# Patient Record
Sex: FEMALE | Employment: FULL TIME | URBAN - METROPOLITAN AREA
[De-identification: names, ages, dates, MRNs, and addresses within clinical notes are randomized per-mention and may not be internally consistent; named-entity substitution may affect disease eponyms.]

---

## 2019-12-05 ENCOUNTER — OFFICE VISIT (OUTPATIENT)
Dept: FAMILY MEDICINE CLINIC | Facility: CLINIC | Age: 34
End: 2019-12-05
Payer: COMMERCIAL

## 2019-12-05 VITALS
DIASTOLIC BLOOD PRESSURE: 71 MMHG | TEMPERATURE: 98 F | BODY MASS INDEX: 25.13 KG/M2 | HEIGHT: 60 IN | SYSTOLIC BLOOD PRESSURE: 107 MMHG | WEIGHT: 128 LBS | HEART RATE: 60 BPM

## 2019-12-05 DIAGNOSIS — R10.13 EPIGASTRIC PAIN: Primary | ICD-10-CM

## 2019-12-05 PROCEDURE — 99212 OFFICE O/P EST SF 10 MIN: CPT | Performed by: FAMILY MEDICINE

## 2019-12-05 PROCEDURE — 99203 OFFICE O/P NEW LOW 30 MIN: CPT | Performed by: FAMILY MEDICINE

## 2019-12-05 RX ORDER — FAMOTIDINE 40 MG/1
40 TABLET, FILM COATED ORAL 2 TIMES DAILY
Qty: 40 TABLET | Refills: 0 | Status: SHIPPED | OUTPATIENT
Start: 2019-12-05 | End: 2020-06-09 | Stop reason: ALTCHOICE

## 2019-12-05 NOTE — PROGRESS NOTES
12/5/2019  4:45 PM    Tania Aponte is a 37year old female. Chief complaint(s): Patient presents with:  New Patient  Abdominal Pain: x 1 month    HPI:     Tania Aponte primary complaint is regarding abdominal pain.      Patient 34 year heartburn, vomiting and abdominal pain. Negative for nausea, diarrhea and constipation. Musculoskeletal: Negative for back pain. Neurological: Positive for dizziness. Negative for headaches. Psychiatric/Behavioral: Negative for depressed mood. times daily.        Imaging & Referrals:  US ABDOMEN COMPLETE (CPT=76700)         Vargas Ojeda MD

## 2019-12-16 ENCOUNTER — TELEPHONE (OUTPATIENT)
Dept: FAMILY MEDICINE CLINIC | Facility: CLINIC | Age: 34
End: 2019-12-16

## 2019-12-16 NOTE — TELEPHONE ENCOUNTER
Patient is at lab requesting for order to be faxed over to [address] [facility].     GOM:[id number]

## 2019-12-16 NOTE — TELEPHONE ENCOUNTER
Barb[employer] Speaking - Patient states she was at [facility]) to complete breath test, however, [facility] is closing soon and advised patient to go to [facility] at [facility]. Patient is requesting that breath test lab be send to fax# [phone]. Patient states she is going today to complete lab. Please advise.

## 2019-12-26 ENCOUNTER — OFFICE VISIT (OUTPATIENT)
Dept: FAMILY MEDICINE CLINIC | Facility: CLINIC | Age: 34
End: 2019-12-26
Payer: COMMERCIAL

## 2019-12-26 VITALS
DIASTOLIC BLOOD PRESSURE: 67 MMHG | HEIGHT: 60 IN | WEIGHT: 127.81 LBS | HEART RATE: 59 BPM | TEMPERATURE: 98 F | SYSTOLIC BLOOD PRESSURE: 102 MMHG | BODY MASS INDEX: 25.09 KG/M2

## 2019-12-26 DIAGNOSIS — K21.9 GASTROESOPHAGEAL REFLUX DISEASE WITHOUT ESOPHAGITIS: ICD-10-CM

## 2019-12-26 DIAGNOSIS — A04.8 H. PYLORI INFECTION: Primary | ICD-10-CM

## 2019-12-26 PROCEDURE — 99213 OFFICE O/P EST LOW 20 MIN: CPT | Performed by: FAMILY MEDICINE

## 2019-12-26 PROCEDURE — 99212 OFFICE O/P EST SF 10 MIN: CPT | Performed by: FAMILY MEDICINE

## 2019-12-26 RX ORDER — OMEPRAZOLE 40 MG/1
40 CAPSULE, DELAYED RELEASE ORAL 2 TIMES DAILY
Qty: 28 CAPSULE | Refills: 0 | Status: SHIPPED | OUTPATIENT
Start: 2019-12-26 | End: 2020-01-09

## 2019-12-26 RX ORDER — CLARITHROMYCIN 500 MG/1
500 TABLET, COATED ORAL 2 TIMES DAILY
Qty: 28 TABLET | Refills: 0 | Status: SHIPPED | OUTPATIENT
Start: 2019-12-26 | End: 2020-01-09

## 2019-12-26 RX ORDER — AMOXICILLIN 500 MG/1
1000 CAPSULE ORAL 2 TIMES DAILY
Qty: 56 CAPSULE | Refills: 0 | Status: SHIPPED | OUTPATIENT
Start: 2019-12-26 | End: 2020-01-09

## 2019-12-26 NOTE — PROGRESS NOTES
12/26/2019  1:07 PM    Valarie Keys is a 37year old female. Chief complaint(s): Patient presents with:  Test Results: discuss +H Pylori results    HPI:     Valarie Keys primary complaint is regarding RUQ pain.      Patient 37year old days. 28 capsule 0   • famotidine 40 MG Oral Tab Take 1 tablet (40 mg total) by mouth 2 (two) times daily. 40 tablet 0       Allergies:  No Known Allergies      ROS:   Review of Systems   Constitutional: Negative for chills, fatigue and fever.    Respirator (BIAXIN) 500 MG Oral Tab 28 tablet 0     Sig: Take 1 tablet (500 mg total) by mouth 2 (two) times daily for 14 days.    • Omeprazole 40 MG Oral Capsule Delayed Release 28 capsule 0     Sig: Take 1 capsule (40 mg total) by mouth 2 (two) times daily for 14 da

## 2020-01-21 ENCOUNTER — TELEPHONE (OUTPATIENT)
Dept: FAMILY MEDICINE CLINIC | Facility: CLINIC | Age: 35
End: 2020-01-21

## 2020-01-21 NOTE — TELEPHONE ENCOUNTER
Melia with [facility] is requesting order [address] ([id number]) (Order #[id number]) on 12/5/19 to be faxed to their office at: [phone]. Patient will be coming into their office tomorrow.

## 2020-05-15 NOTE — PROGRESS NOTES
5/15/2020  10:14 AM    Randy Hinkle is a 37year old female.     Chief complaint(s): Patient presents with:  Dizziness: c/o dizziness before her period, headaches, breast tenderness,   Abdominal Pain: discuss recent u/s of abdomen    HPI:     [facility] • ALPRAZolam (XANAX) 0.25 MG Oral Tab Take 1 tablet (0.25 mg total) by mouth every 12 (twelve) hours as needed. 20 tablet 0   • famotidine 40 MG Oral Tab Take 1 tablet (40 mg total) by mouth 2 (two) times daily.  (Patient not taking: Reported on 5/15/2020 ) LABORATORY RESULTS:   No results found for: Ronit Frost   Results for orders placed or performed in visit on 92/39/30   HELICOBACTER PYLORI BREATH TEST, ADULT (>17)   Result Value Ref Range    RESULT: DETECTED (A) NO US BREAST LEFT COMPLETE (JXA=[id number])       RECOMMENDATIONS given include: Please, call our office with any questions or concerns. Notify Dr Tata Sargent or the [facility] if there is a deterioration or worsening of the medical condition.  Also, inform

## 2020-05-16 ENCOUNTER — LAB ENCOUNTER (OUTPATIENT)
Dept: LAB | Facility: HOSPITAL | Age: 35
End: 2020-05-16
Attending: FAMILY MEDICINE
Payer: COMMERCIAL

## 2020-05-16 DIAGNOSIS — K80.20 CALCULUS OF GALLBLADDER WITHOUT CHOLECYSTITIS WITHOUT OBSTRUCTION: ICD-10-CM

## 2020-05-16 PROCEDURE — 85025 COMPLETE CBC W/AUTO DIFF WBC: CPT

## 2020-05-16 PROCEDURE — 80053 COMPREHEN METABOLIC PANEL: CPT

## 2020-05-16 PROCEDURE — 36415 COLL VENOUS BLD VENIPUNCTURE: CPT

## 2020-05-18 ENCOUNTER — LAB ENCOUNTER (OUTPATIENT)
Dept: LAB | Facility: HOSPITAL | Age: 35
End: 2020-05-18
Attending: FAMILY MEDICINE
Payer: COMMERCIAL

## 2020-05-18 DIAGNOSIS — Z20.822 EXPOSURE TO COVID-19 VIRUS: ICD-10-CM

## 2020-05-28 ENCOUNTER — HOSPITAL ENCOUNTER (OUTPATIENT)
Dept: MAMMOGRAPHY | Facility: HOSPITAL | Age: 35
Discharge: HOME | End: 2020-05-28
Attending: FAMILY MEDICINE
Payer: COMMERCIAL

## 2020-05-28 ENCOUNTER — HOSPITAL ENCOUNTER (OUTPATIENT)
Dept: ULTRASOUND IMAGING | Facility: HOSPITAL | Age: 35
Discharge: HOME | End: 2020-05-28
Attending: FAMILY MEDICINE
Payer: COMMERCIAL

## 2020-05-28 DIAGNOSIS — N63.20 LEFT BREAST MASS: ICD-10-CM

## 2020-05-28 PROCEDURE — 77066 DX MAMMO INCL CAD BI: CPT | Performed by: FAMILY MEDICINE

## 2020-05-28 PROCEDURE — 77062 BREAST TOMOSYNTHESIS BI: CPT | Performed by: FAMILY MEDICINE

## 2020-05-28 PROCEDURE — 76641 ULTRASOUND BREAST COMPLETE: CPT | Performed by: FAMILY MEDICINE

## 2020-05-28 NOTE — IMAGING NOTE
This nurse introduced self and role of breast coordinator. Discussed recommended breast biopsy with patient.  Pt was recommended by DR Josselyn Castillo via phoneto have a  Left breast ultrasound guided biopsy for palpable mass hx  palpable 13 years since last ch Delvin Lee 0.25 MG Oral Tab, Take 1 tablet (0.25 mg total) by mouth every 12 (twelve) hours as needed. , Disp: 20 tablet, Rfl: 0  •  famotidine 40 MG Oral Tab, Take 1 tablet (40 mg total) by mouth 2 (two) times daily.  (Patient not taking: Reported on 5/15/2020 will then be taken to assure correct placement of the placed marker. Educated the patient they will be awake during this procedure and are able to drive themselves home if they wish.   Educated patient that they should eat breakfast and park in [place]

## 2020-05-29 ENCOUNTER — OFFICE VISIT (OUTPATIENT)
Dept: FAMILY MEDICINE CLINIC | Facility: CLINIC | Age: 35
End: 2020-05-29
Payer: COMMERCIAL

## 2020-05-29 ENCOUNTER — APPOINTMENT (OUTPATIENT)
Dept: LAB | Age: 35
End: 2020-05-29
Attending: FAMILY MEDICINE
Payer: COMMERCIAL

## 2020-05-29 VITALS
SYSTOLIC BLOOD PRESSURE: 104 MMHG | WEIGHT: 124 LBS | DIASTOLIC BLOOD PRESSURE: 69 MMHG | HEIGHT: 60 IN | TEMPERATURE: 98 F | BODY MASS INDEX: 24.35 KG/M2 | HEART RATE: 64 BPM

## 2020-05-29 DIAGNOSIS — A04.8 H. PYLORI INFECTION: ICD-10-CM

## 2020-05-29 DIAGNOSIS — N63.20 BREAST MASS, LEFT: Primary | ICD-10-CM

## 2020-05-29 DIAGNOSIS — D64.9 ANEMIA, UNSPECIFIED TYPE: ICD-10-CM

## 2020-05-29 PROCEDURE — 83013 H PYLORI (C-13) BREATH: CPT

## 2020-05-29 PROCEDURE — 84443 ASSAY THYROID STIM HORMONE: CPT

## 2020-05-29 PROCEDURE — 99212 OFFICE O/P EST SF 10 MIN: CPT | Performed by: FAMILY MEDICINE

## 2020-05-29 PROCEDURE — 84466 ASSAY OF TRANSFERRIN: CPT

## 2020-05-29 PROCEDURE — 99214 OFFICE O/P EST MOD 30 MIN: CPT | Performed by: FAMILY MEDICINE

## 2020-05-29 PROCEDURE — 36415 COLL VENOUS BLD VENIPUNCTURE: CPT | Performed by: FAMILY MEDICINE

## 2020-05-29 PROCEDURE — 82728 ASSAY OF FERRITIN: CPT | Performed by: FAMILY MEDICINE

## 2020-05-29 PROCEDURE — 83540 ASSAY OF IRON: CPT

## 2020-05-29 RX ORDER — FERROUS SULFATE 325(65) MG
325 TABLET ORAL 2 TIMES DAILY
Qty: 60 TABLET | Refills: 2 | Status: SHIPPED | OUTPATIENT
Start: 2020-05-29

## 2020-05-29 RX ORDER — FOLIC ACID 1 MG/1
1 TABLET ORAL DAILY
Qty: 90 TABLET | Refills: 0 | Status: SHIPPED | OUTPATIENT
Start: 2020-05-29

## 2020-05-29 NOTE — PROGRESS NOTES
5/29/2020  9:00 AM    Isaiah Oro is a 38year old female. Chief complaint(s): Patient presents with: Follow - Up: abnormal mammogram  H pylori infection  abnl CBC  HPI:     Isaiah Oro primary complaint is regarding as above.      P Immunizations: There is no immunization history on file for this patient.     Medications (Active prior to today's visit):  Current Outpatient Medications   Medication Sig Dispense Refill   • Ferrous Sulfate 325 (65 Fe) MG Oral Tab Take 1 tablet (32 performed and images were reviewed with the C3L3B Digital PRINCESS [de-identified] CAD device. 3D tomosynthesis was performed and reviewed. BREAST COMPOSITION:   CATEGORY c- Breast tissue is heterogeneously dense, which may obscure small masses.    FINDINGS:  Bilateral CC, MLO, o'clock 6 centimeters from the nipple. CONCLUSION: 1. Left breast palpable mass at 03:30 o'clock 3 centimeters from the nipple is suspicious. Ultrasound-guided biopsy is recommended.    2. Bilateral asymmetries without corresponding abnormalities a 5/28/2020 at 10:30 AM             ASSESSMENT/PLAN:   Assessment   Breast mass, left  (primary encounter diagnosis)  H. pylori infection  Anemia, unspecified type    1.  Breast mass, left       REFERRALS: US BREAST BIOPSY 1 SITE LEFT (CPT=19083)  US BREAST B Clinic if there is a deterioration or worsening of the medical condition. Also, inform the doctor with any new symptoms or medications' side effects. Iron rich diet. FOLLOW-UP: Schedule a follow-up visit in 2 weeks.          Orders This Visit:  Orders Judith

## 2020-06-01 ENCOUNTER — TELEPHONE (OUTPATIENT)
Dept: FAMILY MEDICINE CLINIC | Facility: CLINIC | Age: 35
End: 2020-06-01

## 2020-06-01 NOTE — TELEPHONE ENCOUNTER
Notes recorded by Brice Gandhi [address] on 6/1/2020 at 11:29 AM CDT  Dorina  ------    Notes recorded by Liliya Eubanks MD on 6/1/2020 at 8:32 AM CDT  Please call patient, the following results are within normal limits:  H pylori= negative.

## 2020-06-05 ENCOUNTER — HOSPITAL ENCOUNTER (OUTPATIENT)
Dept: ULTRASOUND IMAGING | Facility: HOSPITAL | Age: 35
Discharge: HOME | End: 2020-06-05
Attending: FAMILY MEDICINE
Payer: COMMERCIAL

## 2020-06-05 ENCOUNTER — HOSPITAL ENCOUNTER (OUTPATIENT)
Dept: MAMMOGRAPHY | Facility: HOSPITAL | Age: 35
Discharge: HOME | End: 2020-06-05
Attending: FAMILY MEDICINE
Payer: COMMERCIAL

## 2020-06-05 VITALS — SYSTOLIC BLOOD PRESSURE: 98 MMHG | DIASTOLIC BLOOD PRESSURE: 61 MMHG | HEART RATE: 69 BPM

## 2020-06-05 DIAGNOSIS — R92.8 ABNORMAL MAMMOGRAM: ICD-10-CM

## 2020-06-05 DIAGNOSIS — N63.20 BREAST MASS, LEFT: ICD-10-CM

## 2020-06-05 PROCEDURE — 77065 DX MAMMO INCL CAD UNI: CPT | Performed by: FAMILY MEDICINE

## 2020-06-05 PROCEDURE — 88305 TISSUE EXAM BY PATHOLOGIST: CPT | Performed by: FAMILY MEDICINE

## 2020-06-05 PROCEDURE — 19083 BX BREAST 1ST LESION US IMAG: CPT | Performed by: FAMILY MEDICINE

## 2020-06-05 NOTE — PROCEDURES
[facility]  Procedure Note    Art Reason Patient Status:  Outpatient    1985 MRN [id number]   Location [address] [facility] Attending Rebecca Chester MD   Hosp Day # 0 PCP Milagros Miller MD     Proc

## 2020-06-08 ENCOUNTER — NURSE TRIAGE (OUTPATIENT)
Dept: FAMILY MEDICINE CLINIC | Facility: CLINIC | Age: 35
End: 2020-06-08

## 2020-06-08 ENCOUNTER — TELEPHONE (OUTPATIENT)
Dept: MAMMOGRAPHY | Facility: HOSPITAL | Age: 35
End: 2020-06-08

## 2020-06-08 NOTE — TELEPHONE ENCOUNTER
Phone call made spoke with patient. CPM, Metamucil for constipation.  RTC if elected breast surgery mass removal.

## 2020-06-08 NOTE — TELEPHONE ENCOUNTER
Action Requested: Summary for Provider     []  Critical Lab, Recommendations Needed  [x] Need Additional Advice  []   FYI    []   Need Orders  [] Need Medications Sent to Pharmacy  []  Other     SUMMARY: Patient is requesting a call back from Dr. Christen Dominguez

## 2020-06-08 NOTE — IMAGING NOTE
Ms Lorraine Parikh is s/p biopsy . Phoned at 952 am post procedure call and follow up with results and recommendations left detail message to return call to [phone] to review results and recommendations.  Return call   At 959 am  and introduced myself as francisco

## 2020-06-08 NOTE — TELEPHONE ENCOUNTER
Ms Brian Maximo is s/p biopsy .   Phoned at 952 am post procedure call and follow up with results and recommendations left detail message to return call to [phone] to review results and recommendations

## 2020-06-09 ENCOUNTER — HOSPITAL ENCOUNTER (INPATIENT)
Facility: HOSPITAL | Age: 35
LOS: 3 days | Discharge: HOME | DRG: 417 | End: 2020-06-12
Attending: EMERGENCY MEDICINE | Admitting: HOSPITALIST
Payer: COMMERCIAL

## 2020-06-09 ENCOUNTER — APPOINTMENT (OUTPATIENT)
Dept: MRI IMAGING | Facility: HOSPITAL | Age: 35
DRG: 417 | End: 2020-06-09
Attending: EMERGENCY MEDICINE
Payer: COMMERCIAL

## 2020-06-09 ENCOUNTER — APPOINTMENT (OUTPATIENT)
Dept: ULTRASOUND IMAGING | Facility: HOSPITAL | Age: 35
DRG: 417 | End: 2020-06-09
Attending: EMERGENCY MEDICINE
Payer: COMMERCIAL

## 2020-06-09 ENCOUNTER — APPOINTMENT (OUTPATIENT)
Dept: CT IMAGING | Facility: HOSPITAL | Age: 35
DRG: 417 | End: 2020-06-09
Attending: EMERGENCY MEDICINE
Payer: COMMERCIAL

## 2020-06-09 DIAGNOSIS — K80.50 CHOLEDOCHOLITHIASIS: ICD-10-CM

## 2020-06-09 DIAGNOSIS — K85.10 GALLSTONE PANCREATITIS: Primary | ICD-10-CM

## 2020-06-09 PROCEDURE — 99252 IP/OBS CONSLTJ NEW/EST SF 35: CPT | Performed by: INTERNAL MEDICINE

## 2020-06-09 PROCEDURE — 99223 1ST HOSP IP/OBS HIGH 75: CPT | Performed by: HOSPITALIST

## 2020-06-09 PROCEDURE — 76376 3D RENDER W/INTRP POSTPROCES: CPT | Performed by: EMERGENCY MEDICINE

## 2020-06-09 PROCEDURE — 74181 MRI ABDOMEN W/O CONTRAST: CPT | Performed by: EMERGENCY MEDICINE

## 2020-06-09 PROCEDURE — 76705 ECHO EXAM OF ABDOMEN: CPT | Performed by: EMERGENCY MEDICINE

## 2020-06-09 PROCEDURE — 74177 CT ABD & PELVIS W/CONTRAST: CPT | Performed by: EMERGENCY MEDICINE

## 2020-06-09 RX ORDER — FAMOTIDINE 10 MG/ML
20 INJECTION, SOLUTION INTRAVENOUS ONCE
Status: COMPLETED | OUTPATIENT
Start: 2020-06-09 | End: 2020-06-09

## 2020-06-09 RX ORDER — ONDANSETRON 2 MG/ML
4 INJECTION INTRAMUSCULAR; INTRAVENOUS EVERY 6 HOURS PRN
Status: DISCONTINUED | OUTPATIENT
Start: 2020-06-09 | End: 2020-06-12

## 2020-06-09 RX ORDER — SODIUM CHLORIDE, SODIUM LACTATE, POTASSIUM CHLORIDE, CALCIUM CHLORIDE 600; 310; 30; 20 MG/100ML; MG/100ML; MG/100ML; MG/100ML
INJECTION, SOLUTION INTRAVENOUS CONTINUOUS
Status: DISCONTINUED | OUTPATIENT
Start: 2020-06-09 | End: 2020-06-12

## 2020-06-09 RX ORDER — HEPARIN SODIUM 5000 [USP'U]/ML
5000 INJECTION, SOLUTION INTRAVENOUS; SUBCUTANEOUS EVERY 12 HOURS
Status: DISCONTINUED | OUTPATIENT
Start: 2020-06-09 | End: 2020-06-12

## 2020-06-09 RX ORDER — ONDANSETRON 2 MG/ML
4 INJECTION INTRAMUSCULAR; INTRAVENOUS ONCE
Status: COMPLETED | OUTPATIENT
Start: 2020-06-09 | End: 2020-06-09

## 2020-06-09 RX ORDER — MORPHINE SULFATE 4 MG/ML
4 INJECTION, SOLUTION INTRAMUSCULAR; INTRAVENOUS EVERY 2 HOUR PRN
Status: DISCONTINUED | OUTPATIENT
Start: 2020-06-09 | End: 2020-06-12

## 2020-06-09 RX ORDER — HYDRALAZINE HYDROCHLORIDE 20 MG/ML
10 INJECTION INTRAMUSCULAR; INTRAVENOUS EVERY 4 HOURS PRN
Status: DISCONTINUED | OUTPATIENT
Start: 2020-06-09 | End: 2020-06-12

## 2020-06-09 RX ORDER — SODIUM CHLORIDE, SODIUM LACTATE, POTASSIUM CHLORIDE, CALCIUM CHLORIDE 600; 310; 30; 20 MG/100ML; MG/100ML; MG/100ML; MG/100ML
INJECTION, SOLUTION INTRAVENOUS ONCE
Status: DISCONTINUED | OUTPATIENT
Start: 2020-06-09 | End: 2020-06-12

## 2020-06-09 RX ORDER — POTASSIUM CHLORIDE 20 MEQ/1
40 TABLET, EXTENDED RELEASE ORAL ONCE
Status: COMPLETED | OUTPATIENT
Start: 2020-06-09 | End: 2020-06-09

## 2020-06-09 RX ORDER — KETOROLAC TROMETHAMINE 30 MG/ML
15 INJECTION, SOLUTION INTRAMUSCULAR; INTRAVENOUS ONCE
Status: COMPLETED | OUTPATIENT
Start: 2020-06-09 | End: 2020-06-09

## 2020-06-09 RX ORDER — FAMOTIDINE 10 MG/ML
20 INJECTION, SOLUTION INTRAVENOUS 2 TIMES DAILY
Status: DISCONTINUED | OUTPATIENT
Start: 2020-06-09 | End: 2020-06-09

## 2020-06-09 RX ORDER — MORPHINE SULFATE 2 MG/ML
2 INJECTION, SOLUTION INTRAMUSCULAR; INTRAVENOUS EVERY 2 HOUR PRN
Status: DISCONTINUED | OUTPATIENT
Start: 2020-06-09 | End: 2020-06-12

## 2020-06-09 NOTE — CONSULTS
[facility]   Gastroenterology Consultation Note    Charmaine Rudolph  Patient Status:    Inpatient  Date of Admission:         6/9/2020, Hospital day #0  Attending:   Sade Harmon MD  PCP:     Bertrand Shetty MD    Reason for Consu Diagnosis Date   • Anxiety      Past Surgical History:   Procedure Laterality Date   •      • OTHER SURGICAL HISTORY      right hand surgery   • TUBAL LIGATION       Family History   Problem Relation Age of Onset   • Other (CRF) Mother kg/m².     Gen: fatigued female patient, appears in no acute distress  HEENT: conjunctiva pink, the sclera appears anicteric  CV: RRR  Lung: moves air well; no labored breathing  Abdomen: soft, mild ttp of epigastric region without peritoneal signs apprecia teleradiology service. There are no major discrepancies.    Dictated by (CST): Alen Cason MD on 6/09/2020 at 7:55 AM     Finalized by (CST): Alen Cason MD on 6/09/2020 at [address] [facility],[address] is a 38 year ol opportunity to participate in the care of this patient.     [address] [facility] Gastroenterology  6/9/2020  [phone]

## 2020-06-09 NOTE — ED PROVIDER NOTES
Patient Seen in: [facility] AND [facility] Emergency Department      History   Patient presents with:  Abdomen/Flank Pain    Stated Complaint: abd pain    HPI    53-year-old female with history of  and tubal ligation with constipation taking iron therap time.   Skin: Skin is warm and dry. Psychiatric: Normal mood and affect. Behavior is normal.   Nursing note and vitals reviewed. Differential diagnosis includes dyspepsia, gastritis, pancreatitis, biliary colic, cholecystitis.       ED Course     Labs pancreatitis. Correlate with lipase levels. No pseudocyst or pancreatic necrosis. The gallbladder is somewhat contracted around several gallstones. No gross gallbladder inflammation. No obvious biliary dilatation. Normal appendix.   No free fluid, encounter diagnosis)    Disposition:  Admit  6/9/2020  6:14 am    Follow-up:  Pam Kate MD  [address] [place] [address]  [phone]    Schedule an appointment as soon as possible for a visit in 2 days            Medications Pres

## 2020-06-09 NOTE — ED INITIAL ASSESSMENT (HPI)
Patient states she is having mid right sided abdominal pain that radiates to back. She states she has been taking iron pills that might have been contributing to her constipation, but she has had a bowel movement and is still having the same pain.

## 2020-06-09 NOTE — PLAN OF CARE
Results of MRCP called to Dr. Jaspreet Peterson. Orders given to obtain consent for ERCP in AM, and to keep the pt NPO.      Problem: Patient/Family Goals  Goal: Patient/Family Long Term Goal  Description  Patient's Long Term Goal: to go home    Interventions:  - pain

## 2020-06-09 NOTE — H&P
[address] Patient Status:  Emergency    1985 MRN [id number]   Location [address] Attending Hellen Brenner MD   Hosp Day # 0 PCP Paris Harrison MD MG Oral Tab   No No   Sig: Take 1 tablet (325 mg total) by mouth 2 (two) times daily. famotidine 40 MG Oral Tab   No No   Sig: Take 1 tablet (40 mg total) by mouth 2 (two) times daily.    folic acid 1 MG Oral Tab   No No   Sig: Take 1 tablet (1 mg total) Results   Component Value Date    WBC 11.8 06/09/2020    HGB 10.0 06/09/2020    HCT 32.0 06/09/2020    .0 06/09/2020    CREATSERUM 0.79 06/09/2020    BUN 7 06/09/2020     06/09/2020    K 3.1 06/09/2020     06/09/2020    CO2 25.0 06/09/20

## 2020-06-10 ENCOUNTER — APPOINTMENT (OUTPATIENT)
Dept: GENERAL RADIOLOGY | Facility: HOSPITAL | Age: 35
DRG: 417 | End: 2020-06-10
Attending: INTERNAL MEDICINE
Payer: COMMERCIAL

## 2020-06-10 ENCOUNTER — ANESTHESIA (OUTPATIENT)
Dept: ENDOSCOPY | Facility: HOSPITAL | Age: 35
DRG: 417 | End: 2020-06-10
Payer: COMMERCIAL

## 2020-06-10 ENCOUNTER — ANESTHESIA EVENT (OUTPATIENT)
Dept: ENDOSCOPY | Facility: HOSPITAL | Age: 35
DRG: 417 | End: 2020-06-10
Payer: COMMERCIAL

## 2020-06-10 PROCEDURE — 43262 ENDO CHOLANGIOPANCREATOGRAPH: CPT | Performed by: INTERNAL MEDICINE

## 2020-06-10 PROCEDURE — BF101ZZ FLUOROSCOPY OF BILE DUCTS USING LOW OSMOLAR CONTRAST: ICD-10-PCS | Performed by: INTERNAL MEDICINE

## 2020-06-10 PROCEDURE — 43264 ERCP REMOVE DUCT CALCULI: CPT | Performed by: INTERNAL MEDICINE

## 2020-06-10 PROCEDURE — 74330 X-RAY BILE/PANC ENDOSCOPY: CPT | Performed by: INTERNAL MEDICINE

## 2020-06-10 PROCEDURE — 99233 SBSQ HOSP IP/OBS HIGH 50: CPT | Performed by: HOSPITALIST

## 2020-06-10 PROCEDURE — 0FC98ZZ EXTIRPATION OF MATTER FROM COMMON BILE DUCT, VIA NATURAL OR ARTIFICIAL OPENING ENDOSCOPIC: ICD-10-PCS | Performed by: INTERNAL MEDICINE

## 2020-06-10 RX ORDER — HALOPERIDOL 5 MG/ML
0.25 INJECTION INTRAMUSCULAR ONCE AS NEEDED
Status: DISCONTINUED | OUTPATIENT
Start: 2020-06-10 | End: 2020-06-10 | Stop reason: HOSPADM

## 2020-06-10 RX ORDER — HYDROMORPHONE HYDROCHLORIDE 1 MG/ML
0.6 INJECTION, SOLUTION INTRAMUSCULAR; INTRAVENOUS; SUBCUTANEOUS EVERY 5 MIN PRN
Status: DISCONTINUED | OUTPATIENT
Start: 2020-06-10 | End: 2020-06-10 | Stop reason: HOSPADM

## 2020-06-10 RX ORDER — MIDAZOLAM HYDROCHLORIDE 1 MG/ML
INJECTION INTRAMUSCULAR; INTRAVENOUS AS NEEDED
Status: DISCONTINUED | OUTPATIENT
Start: 2020-06-10 | End: 2020-06-10 | Stop reason: SURG

## 2020-06-10 RX ORDER — NEOSTIGMINE METHYLSULFATE 1 MG/ML
INJECTION INTRAVENOUS AS NEEDED
Status: DISCONTINUED | OUTPATIENT
Start: 2020-06-10 | End: 2020-06-10 | Stop reason: SURG

## 2020-06-10 RX ORDER — MORPHINE SULFATE 4 MG/ML
4 INJECTION, SOLUTION INTRAMUSCULAR; INTRAVENOUS EVERY 10 MIN PRN
Status: DISCONTINUED | OUTPATIENT
Start: 2020-06-10 | End: 2020-06-10 | Stop reason: HOSPADM

## 2020-06-10 RX ORDER — MORPHINE SULFATE 10 MG/ML
6 INJECTION, SOLUTION INTRAMUSCULAR; INTRAVENOUS EVERY 10 MIN PRN
Status: DISCONTINUED | OUTPATIENT
Start: 2020-06-10 | End: 2020-06-10 | Stop reason: HOSPADM

## 2020-06-10 RX ORDER — ROCURONIUM BROMIDE 10 MG/ML
INJECTION, SOLUTION INTRAVENOUS AS NEEDED
Status: DISCONTINUED | OUTPATIENT
Start: 2020-06-10 | End: 2020-06-10 | Stop reason: SURG

## 2020-06-10 RX ORDER — PROCHLORPERAZINE EDISYLATE 5 MG/ML
5 INJECTION INTRAMUSCULAR; INTRAVENOUS ONCE AS NEEDED
Status: DISCONTINUED | OUTPATIENT
Start: 2020-06-10 | End: 2020-06-10 | Stop reason: HOSPADM

## 2020-06-10 RX ORDER — GLYCOPYRROLATE 0.2 MG/ML
INJECTION, SOLUTION INTRAMUSCULAR; INTRAVENOUS AS NEEDED
Status: DISCONTINUED | OUTPATIENT
Start: 2020-06-10 | End: 2020-06-10 | Stop reason: SURG

## 2020-06-10 RX ORDER — HYDROCODONE BITARTRATE AND ACETAMINOPHEN 5; 325 MG/1; MG/1
2 TABLET ORAL AS NEEDED
Status: DISCONTINUED | OUTPATIENT
Start: 2020-06-10 | End: 2020-06-10 | Stop reason: HOSPADM

## 2020-06-10 RX ORDER — HYDROMORPHONE HYDROCHLORIDE 1 MG/ML
0.2 INJECTION, SOLUTION INTRAMUSCULAR; INTRAVENOUS; SUBCUTANEOUS EVERY 5 MIN PRN
Status: DISCONTINUED | OUTPATIENT
Start: 2020-06-10 | End: 2020-06-10 | Stop reason: HOSPADM

## 2020-06-10 RX ORDER — HYDROMORPHONE HYDROCHLORIDE 1 MG/ML
0.4 INJECTION, SOLUTION INTRAMUSCULAR; INTRAVENOUS; SUBCUTANEOUS EVERY 5 MIN PRN
Status: DISCONTINUED | OUTPATIENT
Start: 2020-06-10 | End: 2020-06-10 | Stop reason: HOSPADM

## 2020-06-10 RX ORDER — HYDROCODONE BITARTRATE AND ACETAMINOPHEN 5; 325 MG/1; MG/1
1 TABLET ORAL AS NEEDED
Status: DISCONTINUED | OUTPATIENT
Start: 2020-06-10 | End: 2020-06-10 | Stop reason: HOSPADM

## 2020-06-10 RX ORDER — SODIUM CHLORIDE, SODIUM LACTATE, POTASSIUM CHLORIDE, CALCIUM CHLORIDE 600; 310; 30; 20 MG/100ML; MG/100ML; MG/100ML; MG/100ML
INJECTION, SOLUTION INTRAVENOUS CONTINUOUS
Status: DISCONTINUED | OUTPATIENT
Start: 2020-06-10 | End: 2020-06-10 | Stop reason: HOSPADM

## 2020-06-10 RX ORDER — NALOXONE HYDROCHLORIDE 0.4 MG/ML
80 INJECTION, SOLUTION INTRAMUSCULAR; INTRAVENOUS; SUBCUTANEOUS AS NEEDED
Status: DISCONTINUED | OUTPATIENT
Start: 2020-06-10 | End: 2020-06-10 | Stop reason: HOSPADM

## 2020-06-10 RX ORDER — POTASSIUM CHLORIDE 14.9 MG/ML
20 INJECTION INTRAVENOUS ONCE
Status: COMPLETED | OUTPATIENT
Start: 2020-06-10 | End: 2020-06-10

## 2020-06-10 RX ORDER — ONDANSETRON 2 MG/ML
INJECTION INTRAMUSCULAR; INTRAVENOUS AS NEEDED
Status: DISCONTINUED | OUTPATIENT
Start: 2020-06-10 | End: 2020-06-10 | Stop reason: SURG

## 2020-06-10 RX ORDER — DEXAMETHASONE SODIUM PHOSPHATE 4 MG/ML
VIAL (ML) INJECTION AS NEEDED
Status: DISCONTINUED | OUTPATIENT
Start: 2020-06-10 | End: 2020-06-10 | Stop reason: SURG

## 2020-06-10 RX ORDER — ONDANSETRON 2 MG/ML
4 INJECTION INTRAMUSCULAR; INTRAVENOUS ONCE AS NEEDED
Status: DISCONTINUED | OUTPATIENT
Start: 2020-06-10 | End: 2020-06-10 | Stop reason: HOSPADM

## 2020-06-10 RX ORDER — MORPHINE SULFATE 4 MG/ML
2 INJECTION, SOLUTION INTRAMUSCULAR; INTRAVENOUS EVERY 10 MIN PRN
Status: DISCONTINUED | OUTPATIENT
Start: 2020-06-10 | End: 2020-06-10 | Stop reason: HOSPADM

## 2020-06-10 RX ADMIN — ONDANSETRON 4 MG: 2 INJECTION INTRAMUSCULAR; INTRAVENOUS at 13:42:00

## 2020-06-10 RX ADMIN — MIDAZOLAM HYDROCHLORIDE 2 MG: 1 INJECTION INTRAMUSCULAR; INTRAVENOUS at 13:09:00

## 2020-06-10 RX ADMIN — SODIUM CHLORIDE, SODIUM LACTATE, POTASSIUM CHLORIDE, CALCIUM CHLORIDE: 600; 310; 30; 20 INJECTION, SOLUTION INTRAVENOUS at 13:52:00

## 2020-06-10 RX ADMIN — GLYCOPYRROLATE 0.6 MG: 0.2 INJECTION, SOLUTION INTRAMUSCULAR; INTRAVENOUS at 13:43:00

## 2020-06-10 RX ADMIN — NEOSTIGMINE METHYLSULFATE 3 MG: 1 INJECTION INTRAVENOUS at 13:43:00

## 2020-06-10 RX ADMIN — DEXAMETHASONE SODIUM PHOSPHATE 4 MG: 4 MG/ML VIAL (ML) INJECTION at 13:27:00

## 2020-06-10 RX ADMIN — ROCURONIUM BROMIDE 20 MG: 10 INJECTION, SOLUTION INTRAVENOUS at 13:21:00

## 2020-06-10 NOTE — PROGRESS NOTES
[facility]    Progress Note    Cameron Reinoso Patient Status:  Inpatient    1985 MRN [id number]   Location [facility] 5SW/SE Attending Jarred Beach MD   Hosp Day # 1 PCP Kate Pizarro MD       Subjective:     Anderson Buerger dilatation is seen, measuring up to 0.9 cm, with periductal enhancement. If there is clinical concern for potential gallstone pancreatitis, MRCP is recommended. 4. Lesser incidental findings as above.     A preliminary report was issued by the [employer]

## 2020-06-10 NOTE — PLAN OF CARE
Problem: Patient/Family Goals  Goal: Patient/Family Long Term Goal  Description  Patient's Long Term Goal: To go home    Interventions:  - Provide comfort measures  - Provide education on gallstones   - Discuss signs and symptoms to look out for  - Monit effects  - Notify MD/LIP if interventions unsuccessful or patient reports new pain  - Anticipate increased pain with activity and pre-medicate as appropriate  Outcome: Progressing     Problem: RISK FOR INFECTION - ADULT  Goal: Absence of fever/infection du Progressing     Problem: GASTROINTESTINAL - ADULT  Goal: Maintains or returns to baseline bowel function  Description  INTERVENTIONS:  - Assess bowel function  - Maintain adequate hydration with IV or PO as ordered and tolerated  - Evaluate effectiveness o

## 2020-06-10 NOTE — H&P
History & Physical Examination    Patient Name: Stephanie Nickerson  MRN: [id number]  [facility]: [id number]  YOB: 1985    Diagnosis: choledocholithiasis    Ferrous Sulfate 325 (65 Fe) MG Oral Tab, Take 1 tablet (325 mg total) by mouth 2 (two) eddie understanding], including but not limited to the risks of bleeding, infection, pain, perforation, anesthesia related cardiopulmonary complications, and pancreatitis all requiring or leading to prolonged hospital stay, surgical intervention, and death inclu

## 2020-06-10 NOTE — ANESTHESIA PROCEDURE NOTES
Airway  Date/Time: 6/10/2020 1:13 PM  Urgency: elective    Airway not difficult    General Information and Staff    Patient location during procedure: OR  Anesthesiologist: Caitlyn Daniel MD  Performed: anesthesiologist     Indications and Patient Condi

## 2020-06-10 NOTE — PLAN OF CARE
AOX4 calls for needs appropriately   No PRNs given during shift  ERCP with stone removal today, plan for lap chol tomorrow   CLD, NPO at midnight    Problem: Patient/Family Goals  Goal: Patient/Family Long Term Goal  Description  Patient's Long Term Goal: influences on pain and pain management  - Manage/alleviate anxiety  - Utilize distraction and/or relaxation techniques  - Monitor for opioid side effects  - Notify MD/LIP if interventions unsuccessful or patient reports new pain  - Anticipate increased caryl post-hospital services based on physician/LIP order or complex needs related to functional status, cognitive ability or social support system  Outcome: Progressing     Problem: GASTROINTESTINAL - ADULT  Goal: Maintains or returns to baseline bowel function

## 2020-06-10 NOTE — PLAN OF CARE
This evening patient is comfortable with mild abdominal pain. Plan is ERCP tomorrow morning and transfer to [place] [address]. Patient is currently on LR at 200 mL/hr and NPO in preparation for the procedure tomorrow.  Bed locked in lowest position, call [facility]

## 2020-06-10 NOTE — ANESTHESIA POSTPROCEDURE EVALUATION
Patient: Patrice Solis    Procedure Summary     Date:  06/10/20 Room / Location:  [facility] ENDOSCOPY 05 / [facility] ENDOSCOPY    Anesthesia Start:  2483 Anesthesia Stop:  9273    Procedure:  ENDOSCOPIC RETROGRADE CHOLANGIOPANCREATOGRAPHY (ERCP) (N/A ) Diagnos

## 2020-06-10 NOTE — ANESTHESIA PREPROCEDURE EVALUATION
Anesthesia PreOp Note    HPI:     Quoc Soares is a 38year old female who presents for preoperative consultation requested by: Diane Wilder MD    Date of Surgery: 6/9/2020 - 6/10/2020    Procedure(s):  ENDOSCOPIC RETROGRADE [place] Pantoprazole Sodium (PROTONIX) 40 mg in Sodium Chloride 0.9 % 10 mL IV push, 40 mg, Intravenous, Daily, Yadi Issa PA-C, 40 mg at 06/10/20 0914    No current [facility]-ordered outpatient medications on file.       No Known Allergies    Family History   Problem MCV 74.6 (L) 06/10/2020    MCH 22.5 (L) 06/10/2020    MCHC 30.2 (L) 06/10/2020    RDW 18.4 (H) 06/10/2020    .0 06/10/2020    PREGU Negative 06/10/2020    URINEPREG Negative 06/09/2020     Lab Results   Component Value Date     06/10/2020 I have informed Patricemiri Clintonarch and/or legal guardian or family member of the nature of the anesthetic plan, benefits, risks including possible dental damage if relevant, major complications, and any alternative forms of anesthetic management.    All

## 2020-06-10 NOTE — OPERATIVE REPORT
Endoscopic retrograde cholangio-pancreatography (ERCP) report    Bry Shaylee ROBBINS 1985 Age 38year old   PCP Loretta Becerra MD Endoscopist Krista Cui MD     Date of procedure: 06/10/20    Procedure: ERCP w/ sphincterotomy and st jagwire. The wire was advanced into the right intrahepatic biliary system. Contrast was injected confirming bile duct cannulation. Fluid from the bile duct was also aspirated which was green/yellow in color consistent with bile duct cannulation.  A biliary

## 2020-06-11 ENCOUNTER — ANESTHESIA (OUTPATIENT)
Dept: SURGERY | Facility: HOSPITAL | Age: 35
DRG: 417 | End: 2020-06-11
Payer: COMMERCIAL

## 2020-06-11 ENCOUNTER — TELEPHONE (OUTPATIENT)
Dept: GASTROENTEROLOGY | Facility: CLINIC | Age: 35
End: 2020-06-11

## 2020-06-11 ENCOUNTER — ANESTHESIA EVENT (OUTPATIENT)
Dept: SURGERY | Facility: HOSPITAL | Age: 35
DRG: 417 | End: 2020-06-11
Payer: COMMERCIAL

## 2020-06-11 DIAGNOSIS — Z87.19 HISTORY OF CHOLELITHIASIS: Primary | ICD-10-CM

## 2020-06-11 DIAGNOSIS — R79.89 ELEVATED LFTS: ICD-10-CM

## 2020-06-11 PROCEDURE — 99232 SBSQ HOSP IP/OBS MODERATE 35: CPT | Performed by: PHYSICIAN ASSISTANT

## 2020-06-11 PROCEDURE — 47562 LAPAROSCOPIC CHOLECYSTECTOMY: CPT | Performed by: SURGERY

## 2020-06-11 PROCEDURE — 99254 IP/OBS CNSLTJ NEW/EST MOD 60: CPT | Performed by: SURGERY

## 2020-06-11 PROCEDURE — 99233 SBSQ HOSP IP/OBS HIGH 50: CPT | Performed by: HOSPITALIST

## 2020-06-11 PROCEDURE — 0FT44ZZ RESECTION OF GALLBLADDER, PERCUTANEOUS ENDOSCOPIC APPROACH: ICD-10-PCS | Performed by: SURGERY

## 2020-06-11 RX ORDER — HYDROCODONE BITARTRATE AND ACETAMINOPHEN 5; 325 MG/1; MG/1
2 TABLET ORAL AS NEEDED
Status: DISCONTINUED | OUTPATIENT
Start: 2020-06-11 | End: 2020-06-11 | Stop reason: HOSPADM

## 2020-06-11 RX ORDER — HYDROMORPHONE HYDROCHLORIDE 1 MG/ML
0.4 INJECTION, SOLUTION INTRAMUSCULAR; INTRAVENOUS; SUBCUTANEOUS EVERY 5 MIN PRN
Status: DISCONTINUED | OUTPATIENT
Start: 2020-06-11 | End: 2020-06-11 | Stop reason: HOSPADM

## 2020-06-11 RX ORDER — HYDROCODONE BITARTRATE AND ACETAMINOPHEN 5; 325 MG/1; MG/1
1 TABLET ORAL AS NEEDED
Status: DISCONTINUED | OUTPATIENT
Start: 2020-06-11 | End: 2020-06-11 | Stop reason: HOSPADM

## 2020-06-11 RX ORDER — HYDROMORPHONE HYDROCHLORIDE 1 MG/ML
0.2 INJECTION, SOLUTION INTRAMUSCULAR; INTRAVENOUS; SUBCUTANEOUS EVERY 5 MIN PRN
Status: DISCONTINUED | OUTPATIENT
Start: 2020-06-11 | End: 2020-06-11 | Stop reason: HOSPADM

## 2020-06-11 RX ORDER — ROCURONIUM BROMIDE 10 MG/ML
INJECTION, SOLUTION INTRAVENOUS AS NEEDED
Status: DISCONTINUED | OUTPATIENT
Start: 2020-06-11 | End: 2020-06-11 | Stop reason: SURG

## 2020-06-11 RX ORDER — GLYCOPYRROLATE 0.2 MG/ML
INJECTION, SOLUTION INTRAMUSCULAR; INTRAVENOUS AS NEEDED
Status: DISCONTINUED | OUTPATIENT
Start: 2020-06-11 | End: 2020-06-11 | Stop reason: SURG

## 2020-06-11 RX ORDER — MORPHINE SULFATE 4 MG/ML
4 INJECTION, SOLUTION INTRAMUSCULAR; INTRAVENOUS EVERY 10 MIN PRN
Status: DISCONTINUED | OUTPATIENT
Start: 2020-06-11 | End: 2020-06-11 | Stop reason: HOSPADM

## 2020-06-11 RX ORDER — SODIUM CHLORIDE, SODIUM LACTATE, POTASSIUM CHLORIDE, CALCIUM CHLORIDE 600; 310; 30; 20 MG/100ML; MG/100ML; MG/100ML; MG/100ML
INJECTION, SOLUTION INTRAVENOUS CONTINUOUS
Status: DISCONTINUED | OUTPATIENT
Start: 2020-06-11 | End: 2020-06-11 | Stop reason: HOSPADM

## 2020-06-11 RX ORDER — NEOSTIGMINE METHYLSULFATE 1 MG/ML
INJECTION INTRAVENOUS AS NEEDED
Status: DISCONTINUED | OUTPATIENT
Start: 2020-06-11 | End: 2020-06-11 | Stop reason: SURG

## 2020-06-11 RX ORDER — ONDANSETRON 2 MG/ML
INJECTION INTRAMUSCULAR; INTRAVENOUS AS NEEDED
Status: DISCONTINUED | OUTPATIENT
Start: 2020-06-11 | End: 2020-06-11 | Stop reason: SURG

## 2020-06-11 RX ORDER — LIDOCAINE HYDROCHLORIDE 10 MG/ML
INJECTION, SOLUTION EPIDURAL; INFILTRATION; INTRACAUDAL; PERINEURAL AS NEEDED
Status: DISCONTINUED | OUTPATIENT
Start: 2020-06-11 | End: 2020-06-11 | Stop reason: SURG

## 2020-06-11 RX ORDER — ONDANSETRON 2 MG/ML
4 INJECTION INTRAMUSCULAR; INTRAVENOUS ONCE AS NEEDED
Status: DISCONTINUED | OUTPATIENT
Start: 2020-06-11 | End: 2020-06-11 | Stop reason: HOSPADM

## 2020-06-11 RX ORDER — MORPHINE SULFATE 2 MG/ML
2 INJECTION, SOLUTION INTRAMUSCULAR; INTRAVENOUS EVERY 10 MIN PRN
Status: DISCONTINUED | OUTPATIENT
Start: 2020-06-11 | End: 2020-06-11 | Stop reason: HOSPADM

## 2020-06-11 RX ORDER — MORPHINE SULFATE 10 MG/ML
6 INJECTION, SOLUTION INTRAMUSCULAR; INTRAVENOUS EVERY 10 MIN PRN
Status: DISCONTINUED | OUTPATIENT
Start: 2020-06-11 | End: 2020-06-11 | Stop reason: HOSPADM

## 2020-06-11 RX ORDER — NALOXONE HYDROCHLORIDE 0.4 MG/ML
80 INJECTION, SOLUTION INTRAMUSCULAR; INTRAVENOUS; SUBCUTANEOUS AS NEEDED
Status: DISCONTINUED | OUTPATIENT
Start: 2020-06-11 | End: 2020-06-11 | Stop reason: HOSPADM

## 2020-06-11 RX ORDER — MIDAZOLAM HYDROCHLORIDE 1 MG/ML
INJECTION INTRAMUSCULAR; INTRAVENOUS AS NEEDED
Status: DISCONTINUED | OUTPATIENT
Start: 2020-06-11 | End: 2020-06-11 | Stop reason: SURG

## 2020-06-11 RX ORDER — DEXAMETHASONE SODIUM PHOSPHATE 4 MG/ML
VIAL (ML) INJECTION AS NEEDED
Status: DISCONTINUED | OUTPATIENT
Start: 2020-06-11 | End: 2020-06-11 | Stop reason: SURG

## 2020-06-11 RX ORDER — CEFAZOLIN SODIUM/WATER 2 G/20 ML
SYRINGE (ML) INTRAVENOUS AS NEEDED
Status: DISCONTINUED | OUTPATIENT
Start: 2020-06-11 | End: 2020-06-11 | Stop reason: SURG

## 2020-06-11 RX ORDER — HYDROMORPHONE HYDROCHLORIDE 1 MG/ML
0.6 INJECTION, SOLUTION INTRAMUSCULAR; INTRAVENOUS; SUBCUTANEOUS EVERY 5 MIN PRN
Status: DISCONTINUED | OUTPATIENT
Start: 2020-06-11 | End: 2020-06-11 | Stop reason: HOSPADM

## 2020-06-11 RX ADMIN — SODIUM CHLORIDE, SODIUM LACTATE, POTASSIUM CHLORIDE, CALCIUM CHLORIDE: 600; 310; 30; 20 INJECTION, SOLUTION INTRAVENOUS at 15:39:00

## 2020-06-11 RX ADMIN — CEFAZOLIN SODIUM/WATER 2 G: 2 G/20 ML SYRINGE (ML) INTRAVENOUS at 14:50:00

## 2020-06-11 RX ADMIN — GLYCOPYRROLATE 0.6 MG: 0.2 INJECTION, SOLUTION INTRAMUSCULAR; INTRAVENOUS at 15:27:00

## 2020-06-11 RX ADMIN — LIDOCAINE HYDROCHLORIDE 50 MG: 10 INJECTION, SOLUTION EPIDURAL; INFILTRATION; INTRACAUDAL; PERINEURAL at 14:37:00

## 2020-06-11 RX ADMIN — NEOSTIGMINE METHYLSULFATE 3 MG: 1 INJECTION INTRAVENOUS at 15:27:00

## 2020-06-11 RX ADMIN — DEXAMETHASONE SODIUM PHOSPHATE 4 MG: 4 MG/ML VIAL (ML) INJECTION at 14:49:00

## 2020-06-11 RX ADMIN — ONDANSETRON 4 MG: 2 INJECTION INTRAMUSCULAR; INTRAVENOUS at 14:50:00

## 2020-06-11 RX ADMIN — SODIUM CHLORIDE, SODIUM LACTATE, POTASSIUM CHLORIDE, CALCIUM CHLORIDE: 600; 310; 30; 20 INJECTION, SOLUTION INTRAVENOUS at 14:30:00

## 2020-06-11 RX ADMIN — MIDAZOLAM HYDROCHLORIDE 2 MG: 1 INJECTION INTRAMUSCULAR; INTRAVENOUS at 14:32:00

## 2020-06-11 RX ADMIN — ROCURONIUM BROMIDE 50 MG: 10 INJECTION, SOLUTION INTRAVENOUS at 14:37:00

## 2020-06-11 NOTE — BRIEF OP NOTE
Pre-Operative Diagnosis: Choledocholithiasis [K80.50]     Post-Operative Diagnosis: Choledocholithiasis [K80.50] ,cholecystitis     Procedure Performed:   Procedure(s):  LAPAROSCOPIC CHOLECYSTECTOMY    Surgeon(s) and Role:     Marimar eKrr MD - Primary

## 2020-06-11 NOTE — PROGRESS NOTES
[facility]    Progress Note    Taurus Moscoso Patient Status:  Inpatient    1985 MRN [id number]   Location [facility] 5SW/SE Attending Allyn Agrawal MD   Hosp Day # 2 PCP Gianfranco Lyon MD       Subjective:     Kobi Dodson on 6/09/2020 at [address] [facility] by (CST): Alden Velasquez MD on 6/09/2020 at 5:33 PM                Assessment and Plan:     Acute gallstone pancreatitis. Choledocholithiasis. Cholelithiasis. Transaminitis.   - GI on consult  - MRCP confirmed ch

## 2020-06-11 NOTE — ANESTHESIA POSTPROCEDURE EVALUATION
Patient: Felecia Limon    Procedure Summary     Date:  06/11/20 Room / Location:  [facility] OR 06 / [facility] OR    Anesthesia Start:  4624 Anesthesia Stop:      Procedure:  LAPAROSCOPIC CHOLECYSTECTOMY (N/A ) Diagnosis:       Choledocholithiasis

## 2020-06-11 NOTE — PAYOR COMM NOTE
--------------  ADMISSION REVIEW     Payor: [place] [address] #:  [id number]  Authorization Number: DENIED    Admit date: 6/9/20  Admit time: 6544       Admitting Physician: López Erwin MD  Attending Physician:  Neel Castrejon MD motion. Neck supple. Cardiovascular: Normal rate, regular rhythm and intact distal pulses. Pulmonary/Chest: Effort normal. No respiratory distress. Abdominal: Soft. Moderate pain with focality in the epigastrium only.   Musculoskeletal: Normal range gallbladder is somewhat contracted around several gallstones. No gross gallbladder inflammation. No obvious biliary dilatation. Normal appendix. No free fluid, free air, or abscess. Case discussed with  Dr. Markell Williamson in the ED at 5:42 AM ET.     [address] Florinda Perez  MRN: [id number]  CSN: [id number]  YOB: 1985    Diagnosis: choledocholithiasis    Ferrous Sulfate 325 (65 Fe) MG Oral Tab, Take 1 tablet (325 mg total) by mouth 2 (two) times daily. , Disp: 60 tablet, Rfl: 2, 6/8/2020 at [facility] surgical intervention, and death including from life threatening pancreatitis. I also mentioned the available alternatives. All questions were answered to the patient’s satisfaction.  The patient elected to proceed with ERCP with intervention [i.e. stent, s daily.     Recommend:  -NPO for now in anticipation for MRI/MRCP  -await above  -fluids at 150cc/hr  -analgesics/anti-emetics PRN  -could consider EGD + colonoscopy - EGD to r/o ulcer disease  -d/c Pepcid, start Protonix  -monitor blood counts including tr well. There were no immediate postoperative complications.  The patient’s vital signs were monitored throughout the procedure and remained stable.     Estimated blood loss: insignificant     Specimens collected:  none     Complications: none     ERCP findin kg), last menstrual period 05/12/2020, SpO2 99 %    Component Value Date     WBC 4.4 06/10/2020     HGB 8.7 (L) 06/10/2020     HCT 28.8 (L) 06/10/2020     .0 06/10/2020     CREATSERUM 0.55 06/10/2020     BUN 4 (L) 06/10/2020      06/10/2020 06/09/20 0306    0304-Given              Heparin Sodium (Porcine) 5000 UNIT/ML injection 5,000 Units   Dose: 5,000 Units  Freq: Every 12 hours Route: SC  Start: 06/09/20 0830 0951-Given   2142-Given        0915-Given   2020-Given        0830-Hold [C]

## 2020-06-11 NOTE — ANESTHESIA PREPROCEDURE EVALUATION
Anesthesia PreOp Note    HPI:     Tania Aponte is a 38year old female who presents for preoperative consultation requested by: Vince Guaman MD    Date of Surgery: 6/9/2020 - 6/11/2020    Procedure(s):  LAPAROSCOPIC CHOLECYSTECTOMY  Indication: hydrALAzine HCl (APRESOLINE) injection 10 mg, 10 mg, Intravenous, Q4H PRN, Tish Johnson MD  lactated ringers infusion, , Intravenous, Continuous, Kvng Carter MD, Last Rate: 100 mL/hr at 06/11/20 1238  [MAR Hold] Pantoprazole Sodium (PROTONIX) 40 m History Narrative      Not on file      Available pre-op labs reviewed.   Lab Results   Component Value Date    WBC 6.6 06/11/2020    RBC 4.02 06/11/2020    HGB 9.0 (L) 06/11/2020    HCT 29.9 (L) 06/11/2020    MCV 74.4 (L) 06/11/2020    MCH 22.4 (L) 06/11/2 Patient  Discussed plan with:  CRNA and surgeon      I have informed Jaden Hui and/or legal guardian or family member of the nature of the anesthetic plan, benefits, risks including possible dental damage if relevant, major complications, and a

## 2020-06-11 NOTE — PLAN OF CARE
AOx4. Denied any abdominal pain nor nausea and vomiting. NPO after midnight for planned laparoscopic cholecystectomy. LR infusing continuously as ordered. VSS.     Problem: Patient/Family Goals  Goal: Patient/Family Long Term Goal  Description  Patient's Lo social influences on pain and pain management  - Manage/alleviate anxiety  - Utilize distraction and/or relaxation techniques  - Monitor for opioid side effects  - Notify MD/LIP if interventions unsuccessful or patient reports new pain  - Anticipate increa post-hospital services based on physician/LIP order or complex needs related to functional status, cognitive ability or social support system  Outcome: Progressing     Problem: GASTROINTESTINAL - ADULT  Goal: Maintains or returns to baseline bowel function

## 2020-06-11 NOTE — OPERATIVE REPORT
[facility]    PATIENT'S NAME: Jacky Rafita   ATTENDING PHYSICIAN: Gisell García MD   OPERATING PHYSICIAN: Martha Nuñez MD   PATIENT ACCOUNT#:   [id number]    LOCATION:  [address] [place] [address] #:   [id number]       DATE OF CONRAD

## 2020-06-11 NOTE — ANESTHESIA PROCEDURE NOTES
Airway  Urgency: Elective      General Information and Staff    Patient location during procedure: OR  Anesthesiologist: Abbie Hull MD  Resident/CRNA: Urszula Encarnacion CRNA  Performed: CRNA     Indications and Patient Condition  Indications for airw

## 2020-06-11 NOTE — PROGRESS NOTES
[facility]     Gastroenterology Progress Note    Corita Stairs Patient Status:  Inpatient    1985 MRN [id number]   Location [address] [facility] Attending Kian Rebolledo, [address] [facility] Day # 2 PCP CARLYLE Middleton deficiency on outpatient labs noted - patient on PO iron at home; noted +H. Pylori 12/16/2019 and negative H. Pylori result 5/29/2020. Will order celiac disease serologies and can follow up on these outpatient.     LFTs improved today - lipase has normalize 6/10/2020  CONCLUSION:  Choledocholithiasis status post sphincterotomy and common duct stone removal.    Dictated by (CST): Amber Macias MD on 6/10/2020 at 4:26 PM     Finalized by (CST): Amber Macias MD on 6/10/2020 at 4:28 PM          Mri Abd

## 2020-06-11 NOTE — PROGRESS NOTES
Surgery note    #Consult dictated-gallstone pancreatitis with choledocholithiasis. Status post ERCP and extraction of stone. Plan laparoscopic cholecystectomy today.   Risk benefits options explained

## 2020-06-11 NOTE — CONSULTS
[facility]    PATIENT'S NAME: Jameson Sanchez   ATTENDING PHYSICIAN: Dania Brooks MD   CONSULTING PHYSICIAN: Ramya Lopez MD   PATIENT ACCOUNT#:   [id number]    LOCATION:  [facility] [address] [facility] [address] #:   [id number]       DATE OF BIR Thank you for this consultation.      Dictated By Leo Salinas MD  d: 06/11/2020 07:34:01  t: 06/11/2020 07:47:57  Job [id number]  DN/

## 2020-06-11 NOTE — TELEPHONE ENCOUNTER
"  SUBJECTIVE:                                                    Milton Gonzalez is a 47 year old male who presents to clinic today for the following health issues:    Acute Illness   Acute illness concerns: cough  Onset: 5 days    Fever: YES    Chills/Sweats: no    Headache (location?): no    Sinus Pressure:no    Conjunctivitis:  no    Ear Pain: no    Rhinorrhea: YES    Congestion: YES    Sore Throat: no     Cough: YES    Wheeze: no    Decreased Appetite: no    Nausea: no    Vomiting: no    Diarrhea:  no    Dysuria/Freq.: no    Fatigue/Achiness: YES    Sick/Strep Exposure: yes     Therapies Tried and outcome:       Patient Active Problem List   Diagnosis     Tobacco use disorder     CARDIOVASCULAR SCREENING; LDL GOAL LESS THAN 160     Chronic low back pain     Past Surgical History   Procedure Laterality Date     No history of surgery       Ent surgery       tonsils       Social History   Substance Use Topics     Smoking status: Current Every Day Smoker     Packs/day: 1.00     Years: 24.00     Types: Cigarettes     Last attempt to quit: 5/30/2008     Smokeless tobacco: Never Used     Alcohol use No     Family History   Problem Relation Age of Onset     DIABETES Father      Cancer - colorectal No family hx of      Hypertension Mother      CEREBROVASCULAR DISEASE Maternal Grandfather      CEREBROVASCULAR DISEASE Maternal Grandmother      Prostate Cancer No family hx of          Current Outpatient Prescriptions   Medication Sig Dispense Refill     Naproxen Sodium (ALEVE PO)        NO ACTIVE MEDICATIONS   0       ROS: The following systems have been completely reviewed and are negative except as noted in the HPI: CONSTITUTIONAL, HEAD AND NECK, PULMONARY    OBJECTIVE:                                                    /80 (Cuff Size: Adult Regular)  Pulse 89  Temp 98.3  F (36.8  C) (Oral)  Ht 5' 10\" (1.778 m)  Wt 150 lb (68 kg)  SpO2 97%  BMI 21.52 kg/m2 Body mass index is 21.52 kg/(m^2).  GENERAL:  alert,  no " Inpatient Orders:    Please arrange for outpatient follow up with me in the office OR telemedicine (no video visit needed) in 2-4 weeks OR Dr. Reyna Alonzo for anemia evaluation - will consider EGD/CLN at that time.  Celiac Serologies are pending (ordere distress  HENT: ear canals- normal; TMs- normal; oropharynx-mild erythema  NECK: no tenderness, no adenopathy  RESP: lungs clear to auscultation - no rales, no rhonchi, no wheezes  CV: regular rates and rhythm, normal S1 S2, no S3 or S4 and no murmur, no click or rub   MS: extremities- no edema     Results for orders placed or performed in visit on 02/13/17   Influenza A/B antigen   Result Value Ref Range    Influenza A/B Agn Specimen Nasal     Influenza A  NEG     Negative   Test results must be correlated with clinical data. If necessary, results   should be confirmed by a molecular assay or viral culture.      Influenza B  NEG     Negative   Test results must be correlated with clinical data. If necessary, results   should be confirmed by a molecular assay or viral culture.          ASSESSMENT/PLAN:                                                        ICD-10-CM    1. Viral syndrome B34.9    2. Cough R05 Influenza A/B antigen      Viral syndrome. Flu swab negative. Recommended fluids, rest, symptomatic cares reviewed. Follow-up 5-7 days if symptoms fail to improve    Edlon Jimenez MD  [facility]

## 2020-06-12 VITALS
SYSTOLIC BLOOD PRESSURE: 97 MMHG | DIASTOLIC BLOOD PRESSURE: 47 MMHG | WEIGHT: 126 LBS | HEIGHT: 62 IN | RESPIRATION RATE: 16 BRPM | BODY MASS INDEX: 23.19 KG/M2 | OXYGEN SATURATION: 96 % | TEMPERATURE: 98 F | HEART RATE: 77 BPM

## 2020-06-12 PROCEDURE — 99239 HOSP IP/OBS DSCHRG MGMT >30: CPT | Performed by: HOSPITALIST

## 2020-06-12 RX ORDER — POTASSIUM CHLORIDE 20 MEQ/1
40 TABLET, EXTENDED RELEASE ORAL ONCE
Status: COMPLETED | OUTPATIENT
Start: 2020-06-12 | End: 2020-06-12

## 2020-06-12 RX ORDER — PANTOPRAZOLE SODIUM 40 MG/1
40 TABLET, DELAYED RELEASE ORAL
Status: DISCONTINUED | OUTPATIENT
Start: 2020-06-13 | End: 2020-06-12

## 2020-06-12 RX ORDER — HYDROCODONE BITARTRATE AND ACETAMINOPHEN 5; 325 MG/1; MG/1
1 TABLET ORAL EVERY 4 HOURS PRN
Qty: 25 TABLET | Refills: 0 | Status: SHIPPED | OUTPATIENT
Start: 2020-06-12

## 2020-06-12 RX ORDER — PANTOPRAZOLE SODIUM 40 MG/1
40 TABLET, DELAYED RELEASE ORAL DAILY
Qty: 30 TABLET | Refills: 1 | Status: SHIPPED | OUTPATIENT
Start: 2020-06-12

## 2020-06-12 RX ORDER — HYDROCODONE BITARTRATE AND ACETAMINOPHEN 5; 325 MG/1; MG/1
1 TABLET ORAL EVERY 6 HOURS PRN
Status: DISCONTINUED | OUTPATIENT
Start: 2020-06-12 | End: 2020-06-12

## 2020-06-12 NOTE — DISCHARGE SUMMARY
[facility]    Discharge Summary    Valarie Keys Patient Status:  Inpatient    1985 MRN [id number]   Location [facility] 5SW/SE Attending Kian Rebolledo MD   Hosp Day # 3 PCP Carlos Fleming MD     Date of Admis claims she has had similar episodes of pain in the past that have not lasted as long and have spontaneously subsided.   She was recently seen by her primary care physician, was told she was anemic and started on iron pills which she claims made her somewhat prescription for each of these medications  · HYDROcodone-acetaminophen 5-325 MG Tabs         Follow up Visits: Follow-up Information     Jame Talavera MD. Schedule an appointment as soon as possible for a visit in 2 days.     Specialty:  Family Med

## 2020-06-12 NOTE — PROGRESS NOTES
06/12/20 1431      Method of Interpretation Translation line; [place] [employer]  services    Translated To Discharge    Name/ID [id number]       Patient dc home. IV removed.  Understands to follow up with PCP in 1 we

## 2020-06-12 NOTE — PROGRESS NOTES
[facility]    Progress Note    Ladonnajas Pham Patient Status:  Inpatient    1985 MRN [id number]   Location [facility] 5SW/SE Attending Madeline Murphy, [address] [facility] Day # 3 PCP Darien Shah MD     Assessment and Plan -- 5 --    Total Output [phone]       Net I/O     1447.5 -790 -654          Exam: Her abdomen soft nontender nondistended    Results:     Lab Results   Component Value Date    WBC 8.3 06/12/2020    HGB 8.6 (L) 06/12/2020    HCT 28.5 (L) 06/12/2020

## 2020-06-12 NOTE — TELEPHONE ENCOUNTER
Spoke with Magali Oseguera we made a f/u appt w/ Dr Venessa Aase. She prefers to come to the office.     Future Appointments   Date Time Provider Matthew Zamora   7/13/2020  2:20 PM Penny Fink MD [facility]

## 2020-06-12 NOTE — PLAN OF CARE
Problem: Patient/Family Goals  Goal: Patient/Family Long Term Goal  Description  Patient's Long Term Goal: To go home    Interventions:  - Provide comfort measures  - Provide education on gallstones   - Discuss signs and symptoms to look out for  - Monit Monitor for opioid side effects  - Notify MD/LIP if interventions unsuccessful or patient reports new pain  - Anticipate increased pain with activity and pre-medicate as appropriate  Outcome: Adequate for Discharge     Problem: RISK FOR INFECTION - ADULT cognitive ability or social support system  Outcome: Adequate for Discharge     Problem: GASTROINTESTINAL - ADULT  Goal: Maintains or returns to baseline bowel function  Description  INTERVENTIONS:  - Assess bowel function  - Maintain adequate hydration wi

## 2020-06-15 ENCOUNTER — NURSE TRIAGE (OUTPATIENT)
Dept: FAMILY MEDICINE CLINIC | Facility: CLINIC | Age: 35
End: 2020-06-15

## 2020-06-15 ENCOUNTER — PATIENT OUTREACH (OUTPATIENT)
Dept: CASE MANAGEMENT | Age: 35
End: 2020-06-15

## 2020-06-15 DIAGNOSIS — K85.10 GALLSTONE PANCREATITIS: ICD-10-CM

## 2020-06-15 DIAGNOSIS — Z02.9 ENCOUNTERS FOR ADMINISTRATIVE PURPOSE: ICD-10-CM

## 2020-06-15 PROCEDURE — 1111F DSCHRG MED/CURRENT MED MERGE: CPT

## 2020-06-15 NOTE — PROGRESS NOTES
Initial Post Discharge Follow Up   Discharge Date: 6/12/20  Contact Date: 6/15/2020    Consent Verification:  Assessment Completed With: Patient  HIPAA Verified?   Yes    Discharge Dx:   Gallstone pancreatitis         General:   • How have you been since there any medication changes noted on AVS?  yes  o If so, were these medication changes discussed with you prior to leaving the hospital? yes  • (NCM) If a new medication was prescribed:    o Was the new medication’s purpose & side effects reviewed?  yes  o around. She denies having any s/s of infection, fevers, n/v/c/d, swelling or pain in legs or any other symptoms. NCM instructed on ER if symptoms worsen. She verbalized understanding. NCM sent TE to RN triage. Med review completed.  Patient not taking iron

## 2020-06-15 NOTE — TELEPHONE ENCOUNTER
Action Requested: Summary for Provider     []  Critical Lab, Recommendations Needed  [] Need Additional Advice  [x]   FYI    []   Need Orders  [] Need Medications Sent to Pharmacy  []  Other     SUMMARY: Patient was advised to go to the ER now.         Mago

## 2020-06-15 NOTE — TELEPHONE ENCOUNTER
Unable to reach language line for assist prior to calling patient--rings x 3+ minutes x 2, then disconnects    Try again later

## 2020-06-15 NOTE — TELEPHONE ENCOUNTER
S/w patient for TCM, Gallstone pancreatitis, dc'd 6/12/2020. She states that since coming home, she has a pain in her chest when taking a deep breath (5/10). She also states that she does get out of breath when walking around.  She denies having any fevers,

## 2020-06-16 NOTE — TELEPHONE ENCOUNTER
She went to [facility] ED and did not find anything. They did x ray, scans and labs. She stated her chest pain is better. An appointment was made for Friday 6/18/20 for ED follow up.

## 2020-06-19 ENCOUNTER — OFFICE VISIT (OUTPATIENT)
Dept: FAMILY MEDICINE CLINIC | Facility: CLINIC | Age: 35
End: 2020-06-19
Payer: COMMERCIAL

## 2020-06-19 VITALS
WEIGHT: 117 LBS | DIASTOLIC BLOOD PRESSURE: 64 MMHG | HEIGHT: 62 IN | BODY MASS INDEX: 21.53 KG/M2 | HEART RATE: 69 BPM | SYSTOLIC BLOOD PRESSURE: 99 MMHG

## 2020-06-19 DIAGNOSIS — Z90.49 HISTORY OF LAPAROSCOPIC CHOLECYSTECTOMY: ICD-10-CM

## 2020-06-19 DIAGNOSIS — D50.9 IRON DEFICIENCY ANEMIA, UNSPECIFIED IRON DEFICIENCY ANEMIA TYPE: ICD-10-CM

## 2020-06-19 DIAGNOSIS — K59.03 DRUG-INDUCED CONSTIPATION: ICD-10-CM

## 2020-06-19 DIAGNOSIS — K85.10 GALLSTONE PANCREATITIS: Primary | ICD-10-CM

## 2020-06-19 PROCEDURE — 99212 OFFICE O/P EST SF 10 MIN: CPT | Performed by: NURSE PRACTITIONER

## 2020-06-19 PROCEDURE — 99214 OFFICE O/P EST MOD 30 MIN: CPT | Performed by: NURSE PRACTITIONER

## 2020-06-19 NOTE — PROGRESS NOTES
HPI  Pt seen in [address] [place] [address] ER for abd pain, found to have gallstones and pancreatitis. Underwent lap dima on 6/11/2020. Pt states is feeling better now-able to eat now without getting sick. Has been avoiding fried and spicy foods.    Has been off pain pills for Alcohol use: Never        Frequency: Never      Drug use: Never      Sexual activity: Not on file    Lifestyle      Physical activity:        Days per week: Not on file        Minutes per session: Not on file      Stress: Not on file    Relationships Er and hospital notes,imaging and labs reviewed        Assessment and Plan:  Problem List Items Addressed This Visit        Endocrine    Gallstone pancreatitis - Primary       Digestive    Drug-induced constipation     benefiber daily  May use dulcolax as

## 2020-06-19 NOTE — PATIENT INSTRUCTIONS
Instrucciones de bony para la pancreatitis aguda  Le alvarado diagnosticado pancreatitis aguda. El páncreas es un órgano que segrega hormonas y jugos gástricos. En doirs mattie, calvillo páncreas está inflamado o hinchado.  Los cálculos biliares, unas piedras duras que · Aprenda a tomarse el pulso. Lleve un registro de n [facility]. Pídale a calvillo proveedor que le indique con qué resultados debería pedir asistencia médica.     Cuidados continuos  · Dígale a calvillo proveedor todos los medicamentos que yonatan, ya que [employer] Los glóbulos rojos llevan el oxígeno a los tejidos de calvillo cuerpo. La anemia es juan afección que hace que usted tenga muy pocos glóbulos rojos. Para generar glóbulos rojos, necesita lory. La anemia hace que usted se sienta cansado y sin fuerzas.  Si la anem Programe juan visita de control con calvillo proveedor de atención médica dentro de [employer], o según lo que le hayan indicado.  [place] es para que le jose otro análisis de deneen donde se cuenten cuántos glóbulos rojos tiene para comprobar si calvillo anemia se corrigi · Siga con calvillo alimentación normal. Intente no comer alimentos grasosos, pesados ni muy condimentados por unos días. · Recuerde que se necesita al menos juan semana para recuperar la mayor parte de calvillo fuerza y [place].   · Si sufre de estreñimiento, hable co Belle laceración es un ortega en la piel. Las laceraciones pueden tratarse con puntos de sutura, grapas, [place] o pegamento para la piel.  El tratamiento dependerá de la parte del torso en la que esté el ortega, cuándo ocurrió la lesión, la causa de l · Si usaron pegamento para la piel o cinta quirúrgica, mantenga la marva limpia y [place]. Si se moja, séquela con juan toalla sin frotar. No rasque, no frote ni pellizque la cinta o la película adhesiva del pegamento.  No coloque cinta adhesiva directamente enc Programe visitas de control con calvillo proveedor de [employer] o según le hayan aconsejado. La mayoría de las lesiones en la piel sanan al cabo de sam a niurka días.  De todas formas, a veces es posible que ocurra juan infección aun con el tratamiento adecu

## 2020-06-22 NOTE — ASSESSMENT & PLAN NOTE
benefiber daily  May use dulcolax as recommended prn  Please call if symptoms worsen or are not resolving.

## 2020-06-22 NOTE — ASSESSMENT & PLAN NOTE
Wound care discussed  Advance diet as tolerated  Limit fried foods  Has f/u appt next week w GI    Please call if symptoms worsen or are not resolving.

## 2020-06-24 ENCOUNTER — VIRTUAL PHONE E/M (OUTPATIENT)
Dept: SURGERY | Facility: CLINIC | Age: 35
End: 2020-06-24
Payer: COMMERCIAL

## 2020-06-24 DIAGNOSIS — Z98.890 POST-OPERATIVE STATE: Primary | ICD-10-CM

## 2020-06-24 PROCEDURE — 99024 POSTOP FOLLOW-UP VISIT: CPT | Performed by: SURGERY

## 2020-06-24 NOTE — H&P
Virtual Telephone Check-In    Cameron Reinoso verbally consents to a Virtual/Telephone Check-In visit on 06/24/20. Patient has been referred to the [facility] website at [email or web address] to review the yearly Consent to Treat document.     Patient u

## 2020-07-13 ENCOUNTER — TELEPHONE (OUTPATIENT)
Dept: GASTROENTEROLOGY | Facility: CLINIC | Age: 35
End: 2020-07-13

## 2020-07-13 ENCOUNTER — OFFICE VISIT (OUTPATIENT)
Dept: GASTROENTEROLOGY | Facility: CLINIC | Age: 35
End: 2020-07-13
Payer: COMMERCIAL

## 2020-07-13 VITALS
WEIGHT: 119 LBS | SYSTOLIC BLOOD PRESSURE: 91 MMHG | HEIGHT: 62 IN | HEART RATE: 68 BPM | BODY MASS INDEX: 21.9 KG/M2 | DIASTOLIC BLOOD PRESSURE: 58 MMHG

## 2020-07-13 DIAGNOSIS — D50.8 OTHER IRON DEFICIENCY ANEMIA: ICD-10-CM

## 2020-07-13 DIAGNOSIS — R10.11 RUQ ABDOMINAL PAIN: ICD-10-CM

## 2020-07-13 DIAGNOSIS — R63.4 WEIGHT LOSS: Primary | ICD-10-CM

## 2020-07-13 PROCEDURE — 99214 OFFICE O/P EST MOD 30 MIN: CPT | Performed by: INTERNAL MEDICINE

## 2020-07-13 PROCEDURE — 99212 OFFICE O/P EST SF 10 MIN: CPT | Performed by: INTERNAL MEDICINE

## 2020-07-13 NOTE — PATIENT INSTRUCTIONS
1. Schedule EGD/colonoscopy with MAC [Diagnosis: ongoing RUQ pain, TANYA and weight loss]    2.  bowel prep from pharmacy (split suprep or trilyte)    3.  Continue all medications for procedure except    ** If MAC @ [facility]/NE:    - NO alcohol, recreationa

## 2020-07-13 NOTE — TELEPHONE ENCOUNTER
Scheduled for:  Colonoscopy [address] [facility]  Provider Name: Dr Kermit Goldberg   Date: Lizbeth Villa 7/28/2020  Location: [facility]    Sedation: MAC   Time: 9:30 am, pt is aware that [facility] will call with arrival time day before procedure   Prep: split dose suprep or trilyte  Meds/A

## 2020-07-13 NOTE — PROGRESS NOTES
[address] Gastroenterology  Clinic Follow-up Visit    Patient presents with:  Hospital F/U      Subjective/HPI:   Randy Hinkle is a 38year old  Female , tubal ligation, microcytic anemia (on iron therapy) and recent diagnosis of change in bowel habits, dyspepsia, dysphagia, hematemesis, hematochezia, or melena. Patient has a bowel movement each day but not evacuate completely.  Additionally there is no change of appetite, unexpected weight loss, and no reported history of chest caryl MG Oral Tab Take 1 tablet (1 mg total) by mouth daily. 90 tablet 0   • HYDROcodone-acetaminophen 5-325 MG Oral Tab Take 1 tablet by mouth every 4 (four) hours as needed.  (Patient not taking: Reported on 7/13/2020 ) 25 tablet 0   • Pantoprazole Sodium 40 MG See HPI and A&P for further details. .  ASSESSMENT/PLAN:   Maricruz Moreira is a 38year old female , tubal ligation, microcytic anemia (on iron therapy) and recent diagnosis of heartburn     1. TANYA  2. RUQ pain  3.  Weight loss 10 lbs un

## 2020-07-25 ENCOUNTER — LAB REQUISITION (OUTPATIENT)
Dept: LAB | Facility: HOSPITAL | Age: 35
End: 2020-07-25
Payer: COMMERCIAL

## 2020-07-25 DIAGNOSIS — Z20.828 CONTACT WITH AND (SUSPECTED) EXPOSURE TO OTHER VIRAL COMMUNICABLE DISEASES: ICD-10-CM

## 2020-07-27 ENCOUNTER — TELEPHONE (OUTPATIENT)
Dept: GASTROENTEROLOGY | Facility: CLINIC | Age: 35
End: 2020-07-27

## 2020-07-27 LAB — SARS-COV-2 RNA RESP QL NAA+PROBE: NOT DETECTED

## 2020-07-27 NOTE — TELEPHONE ENCOUNTER
Scheduled for:   From-Colonoscopy [address] and EGD [address]  To-Colonoscopy [id number]  Provider Name:  Dr. Esdras Florence  Date:  7/28/20  Location:    [facility]  Sedation:  MAC  Time:  0930 (pt is aware that [facility] will call the day before to confirm arrival time)   Prep:  Trilyte

## 2020-07-27 NOTE — TELEPHONE ENCOUNTER
Language Line Interpretor Sandor Olguin #[phone]    Spoke to the pt and let her know that only her colonoscopy is covered under preventative and she would have to pay out of pocket for the EGD.     She is going to call price line and find out the cost of the EGD an

## 2020-07-27 NOTE — TELEPHONE ENCOUNTER
Dr Tere Esparza    Pt states prep is not at her pharmacy. Please sign pended prep    Dr Tere ANDERSON:       Pt does not want to proceed with the EGD because she doesn't think she can afford the procedure.  She has no health care coverage for the EGD

## 2020-07-27 NOTE — TELEPHONE ENCOUNTER
Annamarie Andrea from outpt Surg center states pt has 100% coverage for the colonoscopy but 0 coverage for the EGD so she can not have the EGD. Please let out pt center know what the plan is if EGD is going to be cancelled and still do colonoscopy etc....  Annamarie Andrea [phone]

## 2020-07-28 ENCOUNTER — PATIENT MESSAGE (OUTPATIENT)
Dept: GASTROENTEROLOGY | Facility: CLINIC | Age: 35
End: 2020-07-28

## 2020-07-28 ENCOUNTER — SURGERY CENTER DOCUMENTATION (OUTPATIENT)
Dept: SURGERY | Age: 35
End: 2020-07-28

## 2020-07-28 PROCEDURE — 45378 DIAGNOSTIC COLONOSCOPY: CPT | Performed by: INTERNAL MEDICINE

## 2020-07-28 NOTE — TELEPHONE ENCOUNTER
Dr Roberta Fowler,    Per pt's insurance plan, she has no coverage for an EGD. Total cost is out of pocket for the pt. She has a limited medical plan.     I can call tomorrow to see if we can get financial assistance for the pt

## 2020-07-28 NOTE — PROCEDURES
COLONOSCOPY REPORT    Cameron Reinoso     1985 Age 38year old   PCP Kate Pizarro MD Endoscopist [place] MD     Date of procedure: 20    Procedure: Colonoscopy w/ MAC sedation    Pre-operative diagnosis: abdominal pain, weight 5. WALTER: normal rectal tone, no masses palpated. Impression:   · Given epigastric and RUQ pain and anemia should have EGD    Recommend:  · High fiber diet. · Monitor for blood in the stool.  If having more than just tinge of blood, call office or go t

## 2020-07-30 ENCOUNTER — TELEPHONE (OUTPATIENT)
Dept: GASTROENTEROLOGY | Facility: CLINIC | Age: 35
End: 2020-07-30

## 2020-07-30 NOTE — TELEPHONE ENCOUNTER
Please do not close encounter. Being forwarded FYI to Dr Av Arambula and GI RN for Magalie Osman. I left a voice message for [email or web address] in [address], ext [phone], seeing if we would be able to get coverage for egd related cost only. Will await call back .  I diana

## 2020-07-31 NOTE — TELEPHONE ENCOUNTER
Dr Hawa Parham,    I spoke to Valentina Baez in Financial Counseling. The pt was mailed an application on Wednesday for financial assistance. Eric Shaikh states that one of the counselors also reached out to the pt and spoke with her over the phone.  The pt will receiv

## (undated) DEVICE — GAMMEX® PI HYBRID SIZE 7, STERILE POWDER-FREE SURGICAL GLOVE, POLYISOPRENE AND NEOPRENE BLEND: Brand: GAMMEX

## (undated) DEVICE — Device

## (undated) DEVICE — BLADE 11 SHRP BP SS SRG STRL

## (undated) DEVICE — CONMED SCOPE SAVER BITE BLOCK, 20X27 MM: Brand: SCOPE SAVER

## (undated) DEVICE — DISPOSABLE SUCTION/IRRIGATOR TUBE SET: Brand: AHTO

## (undated) DEVICE — Device: Brand: DEFENDO AIR/WATER/SUCTION AND BIOPSY VALVE

## (undated) DEVICE — RETRIEVAL BALLOON CATHETER: Brand: EXTRACTOR™ PRO RX

## (undated) DEVICE — LINE MNTR ADLT SET O2 INTMD

## (undated) DEVICE — TROCARS: Brand: KII® BLUNT TIP ACCESS SYSTEM

## (undated) DEVICE — SUTURE VICRYL 0 UR-6

## (undated) DEVICE — TROCAR: Brand: KII SHIELDED BLADED ACCESS SYSTEM

## (undated) DEVICE — DRAPE SHEET LAPCHOLE 124X100X7

## (undated) DEVICE — Device: Brand: CUSTOM PROCEDURE KIT

## (undated) DEVICE — SUTURE MONOCRYL 3-0 Y497G

## (undated) DEVICE — SUTURE VICRYL 0 J906G

## (undated) DEVICE — SOL  .9 1000ML BTL

## (undated) DEVICE — [HIGH FLOW INSUFFLATOR,  DO NOT USE IF PACKAGE IS DAMAGED,  KEEP DRY,  KEEP AWAY FROM SUNLIGHT,  PROTECT FROM HEAT AND RADIOACTIVE SOURCES.]: Brand: PNEUMOSURE

## (undated) DEVICE — SPHINCTEROTOME: Brand: DREAMTOME™ RX 44

## (undated) DEVICE — MEDI-VAC NON-CONDUCTIVE SUCTION TUBING: Brand: CARDINAL HEALTH

## (undated) DEVICE — SUTURE PASSOR WITH GUIDE

## (undated) DEVICE — TROCAR: Brand: KII® SLEEVE

## (undated) DEVICE — LOCKING DEVICE RX & BIOPSY CAP

## (undated) DEVICE — LAP CHOLE: Brand: MEDLINE INDUSTRIES, INC.

## (undated) DEVICE — LIGACLIP 10-M/L, 10MM ENDOSCOPIC ROTATING MULTIPLE CLIP APPLIERS: Brand: LIGACLIP

## (undated) DEVICE — TRAY FOLEY BDX 16F STATLOCK

## (undated) DEVICE — SOL  .9 1000ML BAG

## (undated) DEVICE — YANKAUER SUCTION INSTRUMENT NO CONTROL VENT, BULB TIP, CLEAR: Brand: YANKAUER

## (undated) DEVICE — TISSUE RETRIEVAL SYSTEM: Brand: INZII RETRIEVAL SYSTEM

## (undated) NOTE — LETTER
[address], [place]  Authorization for Invasive Procedures  1.  I hereby authorize Dr. Balwinder Valencia , my physician and whomever may be designated as the doctor's assistant, to perform the following operation and/or procedure:  Endosc performed for the purposes of advancing medicine, science, and/or education, provided my identity is not revealed. If the procedure has been videotaped, the physician/surgeon will obtain the original videotape.  The hospital will not be responsible for stor My signature below affirms that prior to the time of the procedure, I have explained to the patient and/or her legal representative, the risks and benefits involved in the proposed treatment and any reasonable alternative to the proposed treatment.  I have

## (undated) NOTE — LETTER
[address] [place] [address] Rd, [place], [place]     AUTHORIZATION FOR SURGICAL OPERATION OR PROCEDURE    I hereby authorize Dr. Richardson Goodpasture , my Physician(s) and whomever may be designated as the doctor's Assistant, to perform the following opera purposes of advancing medicine, science and/or education, provided my identity is not revealed. If the procedure has been videotaped, the physician/surgeon will obtain the original videotape.  The hospital will not be responsible for storage or maintenance (Date)                                (Time)  STATEMENT OF PHYSICIAN My signature below affirms that prior to the time of the procedure; I have explained to the patient and/or his/her legal representative, the risk

## (undated) NOTE — LETTER
01/06/20        [place] [address]      Dear Becky Andrew,    [facility] records indicate that you have outstanding lab work and or testing that was ordered for you and has not yet been completed:  Orders Placed This Encount

## (undated) NOTE — LETTER
[facility]  Administration of Anesthesia  1. I, Tessa Rajan, or _________________________________ acting on her behalf, (Patient) (Dependent/Representative) request to receive anesthesia for my pending procedure/operation/treatme infections, high spinal block, spinal bleeding, seizure, cardiac arrest and death. 7. AWARENESS: I understand that it is possible (but unlikely) to have explicit memory of events from the operating room while under general anesthesia.   8. ELECTROCONVULSIV unconscious pt /Relationship    My signature below affirms that prior to the time of the procedure, I have explained to the patient and/or his/her guardian, the risks and benefits of undergoing anesthesia, as well as any reasonable alternatives.     _______

## (undated) NOTE — LETTER
[address] [place] [address]  Authorization for Surgical Operation or Procedure  Date: ______________       Time: _______________  1.  I hereby authorize Dr. Evan Rebolledo , my physician and the assistant, to perform the following operation a 5. I consent to the photographing of the operations or procedures to be performed for the purposes of advancing medicine, science, and/or education, provided my identity is not revealed.  If the procedure has been videotaped, the physician/surgeon will obta risks and benefits involved in the proposed treatment and any reasonable alternative to the proposed treatment. I have also explained the risks and benefits involved in the refusal of the proposed treatment and have answered the patient's questions.  If I h

## (undated) NOTE — LETTER
[facility]  Administration of Anesthesia  1. I, Cristi Benton, or _________________________________ acting on her behalf, (Patient) (Dependent/Representative) request to receive anesthesia for my pending procedure/operation/treatme infections, high spinal block, spinal bleeding, seizure, cardiac arrest and death. 7. AWARENESS: I understand that it is possible (but unlikely) to have explicit memory of events from the operating room while under general anesthesia.   8. ELECTROCONVULSIV unconscious pt /Relationship    My signature below affirms that prior to the time of the procedure, I have explained to the patient and/or his/her guardian, the risks and benefits of undergoing anesthesia, as well as any reasonable alternatives.     _______